# Patient Record
(demographics unavailable — no encounter records)

---

## 2025-05-05 NOTE — ASSESSMENT
[FreeTextEntry1] : Patient is a 60 y/o F former smoker a w/ PMHx of mitral valve prolapse, arthritis, GERD and anxiety who presents today for r/o CF. Patient's son was diagnosed with CF this past year. Of note, patient found out she was a CF carrier ~ 20 years ago when she was pregnant with her son during genetic testing. Her  was also tested at the time and was told he was not a carrier. No further follow up was required at that time.   The patient is generally healthy with no acute concerns. Denies pulmonary, sinus or GI issues. Never any OB issues.  PFT completed today without evidence of obstruction however significant improvement in FEV1 and BDI91-60% post bronchodilator. Lung volumes with evidence of gas trapping.  Diffusion capacity is normal.  r/o CF  - Patient consented to CF registry.  - [employer] sent today.  - Sweat Chloride test ordered. Patient already had scheduling information.  - f/u with patient once results become available.   Menopause - patient has not seen GYN in about 6 years.  - Provided contact info for [facility] Menopause providers

## 2025-05-05 NOTE — REVIEW OF SYSTEMS
[GERD] : gerd [Menopause] : menopause [Arthralgias] : arthralgias [Anxiety] : anxiety [Negative] : Neurologic [TextBox_30] : morning throat clearing  [TextBox_144] : hx of elevated thyroid levels- never on meds

## 2025-05-05 NOTE — END OF VISIT
[FreeTextEntry3] : 60 yo F former smoker a w/ PMHx of mitral valve prolapse, arthritis, GERD and anxiety who presents today for r/o CF. Patient's son was diagnosed with CF this past year. Of note, patient found out she was a CF carrier ([id number]) ~ 20 years ago when she was pregnant with her son during genetic testing. Her  was also tested at the time and was told he was not a carrier. The patient is generally healthy with no acute concerns. Denies pulmonary, sinus or GI issues. Never any OB issues.  PFT completed today without evidence of obstruction however significant improvement in FEV1 and LZJ96-41% post bronchodilator. Lung volumes with evidence of gas trapping.  Diffusion capacity is normal. Plan to send [employer] sent today. Sweat Chloride test ordered.  Ritu Senior MD, MSCR [Time Spent: ___ minutes] : I have spent [unfilled] minutes of time on the encounter which excludes teaching and separately reported services.

## 2025-05-05 NOTE — ASSESSMENT
[FreeTextEntry1] : Patient is a 60 y/o F former smoker a w/ PMHx of mitral valve prolapse, arthritis, GERD and anxiety who presents today for r/o CF. Patient's son was diagnosed with CF this past year. Of note, patient found out she was a CF carrier ~ 20 years ago when she was pregnant with her son during genetic testing. Her  was also tested at the time and was told he was not a carrier. No further follow up was required at that time.   The patient is generally healthy with no acute concerns. Denies pulmonary, sinus or GI issues. Never any OB issues.  PFT completed today without evidence of obstruction however significant improvement in FEV1 and ARS51-31% post bronchodilator. Lung volumes with evidence of gas trapping.  Diffusion capacity is normal.  r/o CF  - Patient consented to CF registry.  - [employer] sent today.  - Sweat Chloride test ordered. Patient already had scheduling information.  - f/u with patient once results become available.   Menopause - patient has not seen GYN in about 6 years.  - Provided contact info for [facility] Menopause providers

## 2025-05-05 NOTE — HISTORY OF PRESENT ILLNESS
[Former] : former [Never] : never [TextBox_4] : Patient is a 60 y/o F former smoker a w/ PMHx of mitral valve prolapse, arthritis, GERD and anxiety who presents today for r/o CF.  Patient's son was diagnosed with CF this past year. Of note, patient found out she was a CF carrier 20 years ago when she was pregnant with her son during genetic testing. Her  was also tested and told at the time that he was not a carrier. No further follow up was required at that time.   The patient states that she is overall in good health. She reports some back pain from arthritis, but otherwise feels well She denies any pulmonary or sinus issues. She does report GERD which is managed with dietary modifications and TUMS PRN. She denies cough, mucus production, chest tightness, wheeze, SOB/IGLESIAS.   She denies any history of pulmonary issues, asthma, PNA's.  + former smoker;  smoked about 1/2 pack per day from age 15-27.  Denies hx of sinus issues.  Denies constipation, diarrhea, bloating, food intolerance, pancreatitis, cholecystitis.  No hx of unexpected weight gain/loss or vitamin deficiencies.  Endo: +family hx of hypothyroidism.  PSYCH: Hx of anxiety.  Family Hx: Son with CF. No family history of lung disease otherwise.  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 .  OB: No hx of miscarriage or difficulty becoming pregnant. Now in menopause. [TextBox_11] : 0.5 [TextBox_13] : 12 [YearQuit] : 1992

## 2025-05-05 NOTE — ASSESSMENT
[FreeTextEntry1] : Patient is a 58 y/o F former smoker a w/ PMHx of mitral valve prolapse, arthritis, GERD and anxiety who presents today for r/o CF. Patient's son was diagnosed with CF this past year. Of note, patient found out she was a CF carrier ~ 20 years ago when she was pregnant with her son during genetic testing. Her  was also tested at the time and was told he was not a carrier. No further follow up was required at that time.   The patient is generally healthy with no acute concerns. Denies pulmonary, sinus or GI issues. Never any OB issues.  PFT completed today without evidence of obstruction however significant improvement in FEV1 and LYJ81-58% post bronchodilator. Lung volumes with evidence of gas trapping.  Diffusion capacity is normal.  r/o CF  - Patient consented to CF registry.  - [employer] sent today.  - Sweat Chloride test ordered. Patient already had scheduling information.  - f/u with patient once results become available.   Menopause - patient has not seen GYN in about 6 years.  - Provided contact info for [facility] [place] Menopause providers

## 2025-05-05 NOTE — END OF VISIT
[FreeTextEntry3] : 58 yo F former smoker a w/ PMHx of mitral valve prolapse, arthritis, GERD and anxiety who presents today for r/o CF. Patient's son was diagnosed with CF this past year. Of note, patient found out she was a CF carrier ([id number]) ~ 20 years ago when she was pregnant with her son during genetic testing. Her  was also tested at the time and was told he was not a carrier. The patient is generally healthy with no acute concerns. Denies pulmonary, sinus or GI issues. Never any OB issues.  PFT completed today without evidence of obstruction however significant improvement in FEV1 and CEX98-54% post bronchodilator. Lung volumes with evidence of gas trapping.  Diffusion capacity is normal. Plan to send [employer] sent today. Sweat Chloride test ordered.  Ritu Senior MD, MSCR [Time Spent: ___ minutes] : I have spent [unfilled] minutes of time on the encounter which excludes teaching and separately reported services.

## 2025-05-05 NOTE — END OF VISIT
[FreeTextEntry3] : 60 yo F former smoker a w/ PMHx of mitral valve prolapse, arthritis, GERD and anxiety who presents today for r/o CF. Patient's son was diagnosed with CF this past year. Of note, patient found out she was a CF carrier ([id number]) ~ 20 years ago when she was pregnant with her son during genetic testing. Her  was also tested at the time and was told he was not a carrier. The patient is generally healthy with no acute concerns. Denies pulmonary, sinus or GI issues. Never any OB issues.  PFT completed today without evidence of obstruction however significant improvement in FEV1 and CUZ25-81% post bronchodilator. Lung volumes with evidence of gas trapping.  Diffusion capacity is normal. Plan to send [employer] sent today. Sweat Chloride test ordered.  Ritu Senior MD, MSCR [Time Spent: ___ minutes] : I have spent [unfilled] minutes of time on the encounter which excludes teaching and separately reported services.

## 2025-05-05 NOTE — HISTORY OF PRESENT ILLNESS
[Former] : former [Never] : never [TextBox_4] : Patient is a 58 y/o F former smoker a w/ PMHx of mitral valve prolapse, arthritis, GERD and anxiety who presents today for r/o CF.  Patient's son was diagnosed with CF this past year. Of note, patient found out she was a CF carrier 20 years ago when she was pregnant with her son during genetic testing. Her  was also tested and told at the time that he was not a carrier. No further follow up was required at that time.   The patient states that she is overall in good health. She reports some back pain from arthritis, but otherwise feels well She denies any pulmonary or sinus issues. She does report GERD which is managed with dietary modifications and TUMS PRN. She denies cough, mucus production, chest tightness, wheeze, SOB/IGLESIAS.   She denies any history of pulmonary issues, asthma, PNA's.  + former smoker;  smoked about 1/2 pack per day from age 15-27.  Denies hx of sinus issues.  Denies constipation, diarrhea, bloating, food intolerance, pancreatitis, cholecystitis.  No hx of unexpected weight gain/loss or vitamin deficiencies.  Endo: +family hx of hypothyroidism.  PSYCH: Hx of anxiety.  Family Hx: Son with CF. No family history of lung disease otherwise.  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 .  OB: No hx of miscarriage or difficulty becoming pregnant. Now in menopause. [TextBox_11] : 0.5 [TextBox_13] : 12 [YearQuit] : 1992

## 2025-07-15 NOTE — PHYSICAL EXAM
[Normal Appearance] : normal appearance [General Appearance - In No Acute Distress] : no acute distress [Normal Conjunctiva] : the conjunctiva exhibited no abnormalities [Eyelids - No Xanthelasma] : the eyelids demonstrated no xanthelasmas [Normal Oral Mucosa] : normal oral mucosa [No Oral Pallor] : no oral pallor [Normal Oropharynx] : normal oropharynx [Neck Appearance] : the appearance of the neck was normal [Neck Cervical Mass (___cm)] : no neck mass was observed [Heart Rate And Rhythm] : heart rate was normal and rhythm regular [Heart Sounds] : normal S1 and S2 [Murmurs] : no murmurs [Respiration, Rhythm And Depth] : normal respiratory rhythm and effort [Exaggerated Use Of Accessory Muscles For Inspiration] : no accessory muscle use [Auscultation Breath Sounds / Voice Sounds] : lungs were clear to auscultation bilaterally [Bowel Sounds] : normal bowel sounds [Abdomen Soft] : soft [Abdomen Tenderness] : non-tender [Abnormal Walk] : normal gait [Nail Clubbing] : no clubbing of the fingernails [Skin Color & Pigmentation] : normal skin color and pigmentation [] : no rash [No Focal Deficits] : no focal deficits [Oriented To Time, Place, And Person] : oriented to person, place, and time [Impaired Insight] : insight and judgment were intact [Affect] : the affect was normal [Mood] : the mood was normal

## 2025-07-28 NOTE — ASSESSMENT
[FreeTextEntry1] : The patient was advised of the diagnosis. The natural history of the pathology was explained in full to the patient in layman's terms. All questions were answered. The risks and benefits of surgical and non-surgical treatment alternatives were explained in full to the patient.  Risk of nonunion/malunion discussed.  Splint applied to right wrist A short arm fiberglass splint was fashioned and applied.  The importance of ice and elevation were discussed with the patient.  The risks were also discussed such as compartment syndrome and skin breakdown.  They were instructed to never put foreign objects down the splint.  Patients should call for increasing pain, worsening swelling, numbness, extremity discoloration, or any other concerns.  We reviewed the importance of finger range of motion. We discussed that while wrist stiffness can be tolerated, finger stiffness causes grave dysfunction and is difficult to overcome once it sets in. The patient was encouraged to engage in finger range of motion exercises. A home exercise program was demonstrated and instructions given to begin immediately and to perform the exercises hourly.  RTO 6 weeks

## 2025-07-28 NOTE — IMAGING
[de-identified] : Right wrist Skin intact NVID FAROM fingers ROM testing deferred due to underlying fracture TTP Tierney's tubercle

## 2025-07-28 NOTE — HISTORY OF PRESENT ILLNESS
[de-identified] : 07/28/2025: tripped over the front steps 8 days ago- went to ED and told no fractures Images reviewed by me today. My independent interpretation of this test is nondisplaced intraarticular distal radius fracture